# Patient Record
Sex: FEMALE | Race: BLACK OR AFRICAN AMERICAN | NOT HISPANIC OR LATINO | Employment: FULL TIME | URBAN - METROPOLITAN AREA
[De-identification: names, ages, dates, MRNs, and addresses within clinical notes are randomized per-mention and may not be internally consistent; named-entity substitution may affect disease eponyms.]

---

## 2022-02-28 ENCOUNTER — HISTORICAL (OUTPATIENT)
Dept: ADMINISTRATIVE | Facility: HOSPITAL | Age: 27
End: 2022-02-28

## 2023-07-27 ENCOUNTER — HOSPITAL ENCOUNTER (EMERGENCY)
Facility: HOSPITAL | Age: 28
Discharge: HOME | End: 2023-07-27
Attending: EMERGENCY MEDICINE
Payer: MEDICAID

## 2023-07-27 VITALS
TEMPERATURE: 99 F | OXYGEN SATURATION: 99 % | DIASTOLIC BLOOD PRESSURE: 87 MMHG | BODY MASS INDEX: 31.98 KG/M2 | HEART RATE: 103 BPM | RESPIRATION RATE: 17 BRPM | HEIGHT: 66 IN | WEIGHT: 199 LBS | SYSTOLIC BLOOD PRESSURE: 126 MMHG

## 2023-07-27 DIAGNOSIS — N92.6 MENSTRUAL IRREGULARITY: Primary | ICD-10-CM

## 2023-07-27 DIAGNOSIS — N83.201 CYSTS OF BOTH OVARIES: ICD-10-CM

## 2023-07-27 DIAGNOSIS — N83.202 CYSTS OF BOTH OVARIES: ICD-10-CM

## 2023-07-27 LAB
ALBUMIN SERPL-MCNC: 3.8 G/DL (ref 3.5–5)
ALBUMIN/GLOB SERPL: 1.2 RATIO (ref 1.1–2)
ALP SERPL-CCNC: 75 UNIT/L (ref 40–150)
ALT SERPL-CCNC: 10 UNIT/L (ref 0–55)
APPEARANCE UR: ABNORMAL
AST SERPL-CCNC: 11 UNIT/L (ref 5–34)
B-HCG UR QL: NEGATIVE
BACTERIA #/AREA URNS AUTO: ABNORMAL /HPF
BASOPHILS # BLD AUTO: 0.04 X10(3)/MCL
BASOPHILS NFR BLD AUTO: 0.6 %
BILIRUB UR QL STRIP.AUTO: NEGATIVE
BILIRUBIN DIRECT+TOT PNL SERPL-MCNC: 0.3 MG/DL
BUN SERPL-MCNC: 8.5 MG/DL (ref 7–18.7)
CALCIUM SERPL-MCNC: 9.3 MG/DL (ref 8.4–10.2)
CHLORIDE SERPL-SCNC: 108 MMOL/L (ref 98–107)
CO2 SERPL-SCNC: 27 MMOL/L (ref 22–29)
COLOR UR: YELLOW
CREAT SERPL-MCNC: 0.87 MG/DL (ref 0.55–1.02)
CTP QC/QA: YES
EOSINOPHIL # BLD AUTO: 0.12 X10(3)/MCL (ref 0–0.9)
EOSINOPHIL NFR BLD AUTO: 1.7 %
ERYTHROCYTE [DISTWIDTH] IN BLOOD BY AUTOMATED COUNT: 14.5 % (ref 11.5–17)
GFR SERPLBLD CREATININE-BSD FMLA CKD-EPI: >60 MLS/MIN/1.73/M2
GLOBULIN SER-MCNC: 3.2 GM/DL (ref 2.4–3.5)
GLUCOSE SERPL-MCNC: 118 MG/DL (ref 74–100)
GLUCOSE UR QL STRIP.AUTO: NORMAL
HCT VFR BLD AUTO: 41.8 % (ref 37–47)
HGB BLD-MCNC: 12.5 G/DL (ref 12–16)
HYALINE CASTS #/AREA URNS LPF: ABNORMAL /LPF
IMM GRANULOCYTES # BLD AUTO: 0.02 X10(3)/MCL (ref 0–0.04)
IMM GRANULOCYTES NFR BLD AUTO: 0.3 %
KETONES UR QL STRIP.AUTO: NEGATIVE
LEUKOCYTE ESTERASE UR QL STRIP.AUTO: 500
LYMPHOCYTES # BLD AUTO: 2.68 X10(3)/MCL (ref 0.6–4.6)
LYMPHOCYTES NFR BLD AUTO: 36.9 %
MCH RBC QN AUTO: 21.9 PG (ref 27–31)
MCHC RBC AUTO-ENTMCNC: 29.9 G/DL (ref 33–36)
MCV RBC AUTO: 73.3 FL (ref 80–94)
MONOCYTES # BLD AUTO: 0.42 X10(3)/MCL (ref 0.1–1.3)
MONOCYTES NFR BLD AUTO: 5.8 %
MUCOUS THREADS URNS QL MICRO: ABNORMAL /LPF
NEUTROPHILS # BLD AUTO: 3.99 X10(3)/MCL (ref 2.1–9.2)
NEUTROPHILS NFR BLD AUTO: 54.7 %
NITRITE UR QL STRIP.AUTO: NEGATIVE
NRBC BLD AUTO-RTO: 0 %
PH UR STRIP.AUTO: 6 [PH]
PLATELET # BLD AUTO: 253 X10(3)/MCL (ref 130–400)
PLATELETS.RETICULATED NFR BLD AUTO: 4.6 % (ref 0.9–11.2)
PMV BLD AUTO: ABNORMAL FL
POTASSIUM SERPL-SCNC: 4 MMOL/L (ref 3.5–5.1)
PROT SERPL-MCNC: 7 GM/DL (ref 6.4–8.3)
PROT UR QL STRIP.AUTO: ABNORMAL
RBC # BLD AUTO: 5.7 X10(6)/MCL (ref 4.2–5.4)
RBC #/AREA URNS AUTO: ABNORMAL /HPF
RBC UR QL AUTO: ABNORMAL
SODIUM SERPL-SCNC: 140 MMOL/L (ref 136–145)
SP GR UR STRIP.AUTO: 1.03
SQUAMOUS #/AREA URNS LPF: ABNORMAL /HPF
UROBILINOGEN UR STRIP-ACNC: NORMAL
WBC # SPEC AUTO: 7.27 X10(3)/MCL (ref 4.5–11.5)
WBC #/AREA URNS AUTO: ABNORMAL /HPF

## 2023-07-27 PROCEDURE — 80053 COMPREHEN METABOLIC PANEL: CPT | Performed by: PHYSICIAN ASSISTANT

## 2023-07-27 PROCEDURE — 81025 URINE PREGNANCY TEST: CPT | Performed by: PHYSICIAN ASSISTANT

## 2023-07-27 PROCEDURE — 99284 EMERGENCY DEPT VISIT MOD MDM: CPT | Mod: 25

## 2023-07-27 PROCEDURE — 25000003 PHARM REV CODE 250: Performed by: PHYSICIAN ASSISTANT

## 2023-07-27 PROCEDURE — 81001 URINALYSIS AUTO W/SCOPE: CPT | Performed by: PHYSICIAN ASSISTANT

## 2023-07-27 PROCEDURE — 87088 URINE BACTERIA CULTURE: CPT | Performed by: PHYSICIAN ASSISTANT

## 2023-07-27 PROCEDURE — 85025 COMPLETE CBC W/AUTO DIFF WBC: CPT | Performed by: PHYSICIAN ASSISTANT

## 2023-07-27 RX ORDER — METHOCARBAMOL 500 MG/1
500 TABLET, FILM COATED ORAL
Status: COMPLETED | OUTPATIENT
Start: 2023-07-27 | End: 2023-07-27

## 2023-07-27 RX ORDER — KETOROLAC TROMETHAMINE 10 MG/1
10 TABLET, FILM COATED ORAL EVERY 6 HOURS
Qty: 20 TABLET | Refills: 0 | Status: SHIPPED | OUTPATIENT
Start: 2023-07-27 | End: 2023-08-01

## 2023-07-27 RX ORDER — KETOROLAC TROMETHAMINE 10 MG/1
10 TABLET, FILM COATED ORAL
Status: COMPLETED | OUTPATIENT
Start: 2023-07-27 | End: 2023-07-27

## 2023-07-27 RX ADMIN — METHOCARBAMOL 500 MG: 500 TABLET ORAL at 01:07

## 2023-07-27 RX ADMIN — KETOROLAC TROMETHAMINE 10 MG: 10 TABLET, FILM COATED ORAL at 01:07

## 2023-07-27 NOTE — ED PROVIDER NOTES
Encounter Date: 7/27/2023       History     Chief Complaint   Patient presents with    Vaginal Bleeding     Vaginal bleeding x 9 days and lower back pain. States irregular menstrual cycles at baseline. Does not have a GYN.     Layla Luke is a 28 y.o. female who presents to the ED with complaints of vaginal bleeding and low back pain that started 9 day(s) ago. She reports she has heavy, irregular menstrual cycles. States she does not have a GYN. She reports she was seen at [facility] 2 days ago and was treated for a UTI. She states she is on Ciprofloxacin and has had 3 doses of it so far. She denies radiation of back pain down the legs, numbness/tingling, loss of bowel or bladder control.        The history is provided by the patient. No  was used.   Review of patient's allergies indicates:  No Known Allergies  Past Medical History:   Diagnosis Date    Arm pain     Obesity, unspecified     Potential for deficient knowledge      History reviewed. No pertinent surgical history.  History reviewed. No pertinent family history.  Social History     Tobacco Use    Smoking status: Never    Smokeless tobacco: Never   Substance Use Topics    Alcohol use: Never    Drug use: Never     Review of Systems   Constitutional:  Negative for chills, fatigue and fever.   HENT:  Negative for congestion, ear pain, sinus pain and sore throat.    Eyes:  Negative for pain.   Respiratory:  Negative for cough, chest tightness and shortness of breath.    Cardiovascular:  Negative for chest pain.   Gastrointestinal:  Negative for abdominal pain, constipation, diarrhea, nausea and vomiting.   Genitourinary:  Positive for dysuria. Negative for flank pain, hematuria and pelvic pain.   Musculoskeletal:  Positive for back pain. Negative for joint swelling and myalgias.   Skin:  Negative for color change and rash.   Neurological:  Negative for dizziness and weakness.   Psychiatric/Behavioral:  Negative for behavioral  problems and confusion.      Physical Exam     Initial Vitals [07/27/23 1151]   BP Pulse Resp Temp SpO2   126/87 103 17 98.6 °F (37 °C) 99 %      MAP       --         Physical Exam    Constitutional: She appears well-developed and well-nourished.   HENT:   Head: Normocephalic and atraumatic.   Nose: Nose normal.   Eyes: EOM are normal. Pupils are equal, round, and reactive to light.   Neck: Neck supple. No thyromegaly present. No JVD present.   Normal range of motion.  Cardiovascular:  Normal rate, regular rhythm, normal heart sounds and intact distal pulses.           No murmur heard.  Pulmonary/Chest: Breath sounds normal. No respiratory distress. She has no wheezes. She has no rhonchi. She has no rales. She exhibits no tenderness.   Abdominal: Abdomen is soft. Bowel sounds are normal. She exhibits no distension. There is no abdominal tenderness. There is no rebound and no guarding.   Musculoskeletal:         General: No tenderness or edema. Normal range of motion.      Cervical back: Normal range of motion and neck supple.     Lymphadenopathy:     She has no cervical adenopathy.   Neurological: She is alert and oriented to person, place, and time.   Skin: Skin is warm and dry. Capillary refill takes less than 2 seconds.   Psychiatric: She has a normal mood and affect. Thought content normal.       ED Course   Procedures  Labs Reviewed   URINALYSIS, REFLEX TO URINE CULTURE - Abnormal; Notable for the following components:       Result Value    Appearance, UA Turbid (*)     Protein, UA 1+ (*)     Blood, UA 3+ (*)     Leukocyte Esterase,  (*)     WBC, UA 21-50 (*)     Bacteria, UA Trace (*)     Squamous Epithelial Cells, UA Many (*)     Mucous, UA Trace (*)     RBC, UA 21-50 (*)     All other components within normal limits   COMPREHENSIVE METABOLIC PANEL - Abnormal; Notable for the following components:    Chloride 108 (*)     Glucose Level 118 (*)     All other components within normal limits   CBC WITH  DIFFERENTIAL - Abnormal; Notable for the following components:    RBC 5.70 (*)     MCV 73.3 (*)     MCH 21.9 (*)     MCHC 29.9 (*)     All other components within normal limits   CULTURE, URINE   CBC W/ AUTO DIFFERENTIAL    Narrative:     The following orders were created for panel order CBC auto differential.  Procedure                               Abnormality         Status                     ---------                               -----------         ------                     CBC with Differential[[id number]]        Abnormal            Final result                 Please view results for these tests on the individual orders.   POCT URINE PREGNANCY          Imaging Results              US Pelvis Comp with Transvag NON-OB (xpd (Final result)  Result time 07/27/23 14:21:18      Final result by Gonzalez Alanis MD (07/27/23 14:21:18)                   Impression:      1. No acute findings in the pelvis.  2. Ovaries have a polycystic appearance.  Correlate clinically for polycystic ovarian syndrome.      Electronically signed by: Gonzalez Alanis  Date:    07/27/2023  Time:    14:21               Narrative:    EXAMINATION:  US PELVIS COMP WITH TRANSVAG NON-OB (XPD)    CLINICAL HISTORY:  vaginal bleeding;    COMPARISON:  None    FINDINGS:  Transabdominal and transvaginal scanning of the pelvis with grayscale, color and spectral Doppler.    The anteverted uterus measures about 7 cm in length.  No endometrial thickening.    Vascular flow to the ovaries is demonstrated.  There are small peripheral follicles in both ovaries.  The right ovary measures 3.3 x 3.2 x 2.7 cm.  Volume 15 cc.  The left ovary measures 3.6 x 3.4 x 2.5 cm.  Volume 16 cc.    No adnexal mass or significant pelvic free fluid.                                       Medications   ketorolac tablet 10 mg (10 mg Oral Given 7/27/23 1316)   methocarbamoL tablet 500 mg (500 mg Oral Given 7/27/23 1316)                              Clinical Impression:   Final  diagnoses:  [N92.6] Menstrual irregularity (Primary)  [N83.201, N83.202] Cysts of both ovaries        ED Disposition Condition    Discharge Stable          ED Prescriptions       Medication Sig Dispense Start Date End Date Auth. Provider    ketorolac (TORADOL) 10 mg tablet Take 1 tablet (10 mg total) by mouth every 6 (six) hours. for 5 days 20 tablet 7/27/2023 8/1/2023 Cathy Flor PA-C          Follow-up Information       Follow up With Specialties Details Why Contact Info    [facility]  In 1 week  [address] [id number]    [facility] Emergency Medicine In 3 days As needed, If symptoms worsen [facility] [facility] [address] [phone]             Cathy Flor PA-C  07/27/23 1459

## 2023-07-27 NOTE — FIRST PROVIDER EVALUATION
"Medical screening examination initiated.  I have conducted a focused provider triage encounter, findings are as follows:    Brief history of present illness:  28 y.o. female with history of irregular, heavy periods presents with heavy menses and bilateral lower back pain.     Vitals:    07/27/23 1151   BP: 126/87   BP Location: Left arm   Patient Position: Sitting   Pulse: 103   Resp: 17   Temp: 98.6 °F (37 °C)   TempSrc: Oral   SpO2: 99%   Weight: 90.3 kg (199 lb)   Height: 5' 6" (1.676 m)       Pertinent physical exam:  resting comfortably in NAD.     Brief workup plan:  labs, UA, UPT    Preliminary workup initiated; this workup will be continued and followed by the physician or advanced practice provider that is assigned to the patient when roomed.  "

## 2023-07-29 LAB — BACTERIA UR CULT: NO GROWTH

## 2024-04-29 ENCOUNTER — OFFICE VISIT (OUTPATIENT)
Dept: GYNECOLOGY | Facility: CLINIC | Age: 29
End: 2024-04-29
Payer: MEDICAID

## 2024-04-29 ENCOUNTER — TELEPHONE (OUTPATIENT)
Dept: GYNECOLOGY | Facility: CLINIC | Age: 29
End: 2024-04-29
Payer: MEDICAID

## 2024-04-29 VITALS
HEART RATE: 73 BPM | RESPIRATION RATE: 18 BRPM | SYSTOLIC BLOOD PRESSURE: 122 MMHG | HEIGHT: 66 IN | DIASTOLIC BLOOD PRESSURE: 87 MMHG | OXYGEN SATURATION: 100 % | TEMPERATURE: 98 F | WEIGHT: 199.06 LBS | BODY MASS INDEX: 31.99 KG/M2

## 2024-04-29 DIAGNOSIS — N92.6 MENSTRUAL IRREGULARITY: ICD-10-CM

## 2024-04-29 DIAGNOSIS — Z01.419 WELL WOMAN EXAM WITH ROUTINE GYNECOLOGICAL EXAM: ICD-10-CM

## 2024-04-29 DIAGNOSIS — Z11.3 SCREEN FOR STD (SEXUALLY TRANSMITTED DISEASE): ICD-10-CM

## 2024-04-29 DIAGNOSIS — E28.2 POLYCYSTIC OVARIAN SYNDROME: Primary | ICD-10-CM

## 2024-04-29 DIAGNOSIS — N83.202 CYSTS OF BOTH OVARIES: ICD-10-CM

## 2024-04-29 DIAGNOSIS — N83.201 CYSTS OF BOTH OVARIES: ICD-10-CM

## 2024-04-29 LAB
C TRACH DNA SPEC QL NAA+PROBE: NOT DETECTED
CLUE CELLS VAG QL WET PREP: ABNORMAL
N GONORRHOEA DNA SPEC QL NAA+PROBE: NOT DETECTED
SOURCE (OHS): NORMAL
T VAGINALIS VAG QL WET PREP: ABNORMAL
WBC #/AREA VAG WET PREP: ABNORMAL
YEAST SPEC QL WET PREP: ABNORMAL

## 2024-04-29 PROCEDURE — 87210 SMEAR WET MOUNT SALINE/INK: CPT

## 2024-04-29 PROCEDURE — 87591 N.GONORRHOEAE DNA AMP PROB: CPT

## 2024-04-29 PROCEDURE — 99213 OFFICE O/P EST LOW 20 MIN: CPT | Mod: PBBFAC

## 2024-04-29 PROCEDURE — 88174 CYTOPATH C/V AUTO IN FLUID: CPT

## 2024-04-29 RX ORDER — NORGESTIMATE AND ETHINYL ESTRADIOL 0.25-0.035
1 KIT ORAL DAILY
Qty: 30 TABLET | Refills: 11 | Status: SHIPPED | OUTPATIENT
Start: 2024-04-29 | End: 2025-04-29

## 2024-04-29 NOTE — PROGRESS NOTES
[facility] GYNECOLOGY CLINIC NOTE     Layla Luke is a 28 y.o.  with class II obesity presenting to GYN clinic for evaluation of irregular menses and bilateral ovarian cysts.     She states that her menses have been irregular since menarche. She will intermittently miss menstrual periods and subsequently bleed for 20 days consecutively. She typically uses 3-4 tampons per day; denies dysmenorrhea. She also reports increased facial hair for the past 4 years; denies acne. She has gained weight recently but attributes it to unhealthy eating and lack of exercise. She denies increased fatigue, heat/cold intolerance, hair loss, and dry skin. Also denies significant gum bleeding, epistaxis, and family h/o bleeding disorders.    She is currently sexually active with 1 female partner, engaging in both vaginal and anal intercourse; denies dyspareunia. She is considering fertility options in the future, but does not desire to conceive for at least another two years.    Patient denies abnormal discharge, pelvic pain, abdominal pain, vulvar rash or skin changes, dysuria, dyspareunia.    Gynecology  Menstrual triad: -  LMP: 3/8/2024  STI Hx: CT s/p treatment ()   Pap Hx: denies h/o abnormal pap smears; most recent pap smear reportedly normal in 2019    OB History          0    Para   0    Term   0       0    AB   0    Living   0         SAB   0    IAB   0    Ectopic   0    Multiple   0    Live Births   0                Past Medical History:   Diagnosis Date    Anemia     Arm pain     Obesity, unspecified     Potential for deficient knowledge       No past surgical history on file.     Current Outpatient Medications   Medication Instructions    norgestimate-ethinyl estradioL (ORTHO-CYCLEN) 0.25-35 mg-mcg per tablet 1 tablet, Oral, Daily     Social History     Tobacco Use    Smoking status: Every Day     Current packs/day: 0.50     Average packs/day: 0.5 packs/day for 9.0 years (4.5 ttl pk-yrs)      "Types: Cigarettes     Start date: 4/29/2015     Passive exposure: Current    Smokeless tobacco: Never   Substance Use Topics    Alcohol use: Yes    Drug use: Never     Review of Systems  Pertinent items noted in HPI.    Objective:     Vitals:    04/29/24 0919   BP: 122/87   Pulse: 73   Resp: 18   Temp: 98.1 °F (36.7 °C)   TempSrc: Oral   SpO2: 100%   Weight: 90.3 kg (199 lb 1.2 oz)   Height: 5' 6" (1.676 m)     Body mass index is 32.13 kg/m².    Physical Exam:   General: Alert and oriented, in no acute distress  Lungs: Breathing comfortably on room air, no conversational dyspnea  Heart: Regular rate, extremities well-perfused  Breasts: General/bilateral: Appearance of the breast was without rashes or lesions. Palpation of the right breast revealed no lumps, tenderness, or nipple discharge. Palpation of the left breast revealed no lumps, tenderness, or nipple discharge.  Abdomen: Soft, non-distended, non tender to palpation, no involuntary guarding, no rebound tenderness  Extremities: Atraumatic, non-edematous, no cords or calf tenderness, no significant calf/ankle edema  External genitalia: Normal female genitalia without lesion, discharge or tenderness. Normal-appearing urethral meatus.  Bimanual Exam: Uterus 6-8cm in size, freely mobile, smooth in contour, no masses. No cervical motion tenderness. No adnexal fullness/tenderness.  Speculum Exam: Vaginal mucosa pink and moist, without lesion. Scant, white discharge present in vaginal canal. Cervix well-visualized, smooth in contour, with no masses or lesions. Os normal in appearance without blood or discharge.   Note:  Female nurse chaperone present for entirety of exam.    Relevant Labs:   Lab Results   Component Value Date    WBC 7.27 07/27/2023    HGB 12.5 07/27/2023    HCT 41.8 07/27/2023    MCV 73.3 (L) 07/27/2023     07/27/2023     Relevant Imaging:  TVUS 07/27/23  FINDINGS:  Transabdominal and transvaginal scanning of the pelvis with grayscale, color " and spectral Doppler.  The anteverted uterus measures about 7 cm in length.  No endometrial thickening.  Vascular flow to the ovaries is demonstrated.  There are small peripheral follicles in both ovaries.  The right ovary measures 3.3 x 3.2 x 2.7 cm.  Volume 15 cc.  The left ovary measures 3.6 x 3.4 x 2.5 cm.  Volume 16 cc.  No adnexal mass or significant pelvic free fluid.  Impression:  1. No acute findings in the pelvis.  2. Ovaries have a polycystic appearance.  Correlate clinically for polycystic ovarian syndrome.    Assessment:       28 y.o.  with newly-diagnosed PCOS presents for further evaluation and management.    1. Polycystic ovarian syndrome  Lipid Panel    Glucose Tolerance, 2 Hours      2. Menstrual irregularity  Ambulatory referral/consult to Gynecology    POCT urine pregnancy      3. Cysts of both ovaries  Ambulatory referral/consult to Gynecology      4. Well woman exam with routine gynecological exam  Liquid-Based Pap Smear, Screening Screening    Chlamydia/GC, PCR    HIV 1/2 Ag/Ab (4th Gen)    SYPHILIS ANTIBODY (WITH REFLEX RPR)    Hepatitis B Surface Antigen    Hepatitis C Antibody      5. Screen for STD (sexually transmitted disease)  Chlamydia/GC, PCR    HIV 1/2 Ag/Ab (4th Gen)    SYPHILIS ANTIBODY (WITH REFLEX RPR)    Hepatitis B Surface Antigen    Hepatitis C Antibody    Wet Prep, Genital        Plan:     -- PCOS diagnosed based on oligomenorrhea, clinical hyperandrogenism, and polycystic ovaries on ultrasound; UPT negative in clinic today  -- plan to obtain 2-hour OGTT and fasting lipid panel; will initiate Metformin if evidence of insulin resistance on 2-hour OGTT  -- recommend initiation of OCPs to regulate ovulation now and to improve fertility in the future - patient without contraindications to COCs; Ortho Cyclen Rx sent to her preferred pharmacy  -- pap smear and STI panel collected for healthcare maintenance  -- counseled patient regarding lifestyle modifications to optimize  weight and weight loss goals with PCOS; patient does not have a PCP and desires further assistance; Family Practice referral ordered    Problem List Items Addressed This Visit    None  Visit Diagnoses       Polycystic ovarian syndrome    -  Primary    Relevant Orders    Lipid Panel    Glucose Tolerance, 2 Hours    Menstrual irregularity        Relevant Orders    POCT urine pregnancy    Cysts of both ovaries        Well woman exam with routine gynecological exam        Relevant Orders    Liquid-Based Pap Smear, Screening Screening    Chlamydia/GC, PCR (Completed)    HIV 1/2 Ag/Ab (4th Gen)    SYPHILIS ANTIBODY (WITH REFLEX RPR)    Hepatitis B Surface Antigen    Hepatitis C Antibody    Screen for STD (sexually transmitted disease)        Relevant Orders    Chlamydia/GC, PCR (Completed)    HIV 1/2 Ag/Ab (4th Gen)    SYPHILIS ANTIBODY (WITH REFLEX RPR)    Hepatitis B Surface Antigen    Hepatitis C Antibody    Wet Prep, Genital (Completed)          Return to clinic in 6 months for telemedicine visit    Discussed patient and plan with Dr. Greene.    Xin Collado, MS3  U OBLEIGH Mckeon MD  [employer] [facility], PGY-3

## 2024-04-29 NOTE — TELEPHONE ENCOUNTER
----- Message from Cat Mckeon MD sent at 4/29/2024 12:15 PM CDT -----  Please let this patient know there her STD testing so far has come back negative. The only things remaining are the blood tests, which she hasn't had drawn yet.  ----- Message -----  From: Lab, Background User  Sent: 4/29/2024  10:51 AM CDT  To: Cat Mckeon MD

## 2024-05-01 LAB — PSYCHE PATHOLOGY RESULT: NORMAL

## 2025-02-17 ENCOUNTER — TELEPHONE (OUTPATIENT)
Dept: GYNECOLOGY | Facility: CLINIC | Age: 30
End: 2025-02-17
Payer: MEDICAID